# Patient Record
Sex: FEMALE | Race: BLACK OR AFRICAN AMERICAN
[De-identification: names, ages, dates, MRNs, and addresses within clinical notes are randomized per-mention and may not be internally consistent; named-entity substitution may affect disease eponyms.]

---

## 2020-01-19 ENCOUNTER — HOSPITAL ENCOUNTER (EMERGENCY)
Dept: HOSPITAL 53 - M ED | Age: 25
Discharge: HOME | End: 2020-01-19
Payer: COMMERCIAL

## 2020-01-19 VITALS — DIASTOLIC BLOOD PRESSURE: 77 MMHG | SYSTOLIC BLOOD PRESSURE: 133 MMHG

## 2020-01-19 VITALS — HEIGHT: 64 IN | WEIGHT: 158.29 LBS | BODY MASS INDEX: 27.02 KG/M2

## 2020-01-19 DIAGNOSIS — R10.32: ICD-10-CM

## 2020-01-19 DIAGNOSIS — O26.891: ICD-10-CM

## 2020-01-19 DIAGNOSIS — O23.591: Primary | ICD-10-CM

## 2020-01-19 DIAGNOSIS — Z3A.01: ICD-10-CM

## 2020-01-19 LAB
B-HCG SERPL-ACNC: (no result) MIU/ML
BASOPHILS # BLD AUTO: 0 10^3/UL (ref 0–0.2)
BASOPHILS NFR BLD AUTO: 0.2 % (ref 0–1)
BUN SERPL-MCNC: 12 MG/DL (ref 7–18)
CALCIUM SERPL-MCNC: 8.9 MG/DL (ref 8.5–10.1)
CHLAMYDIA DNA AMPLIFICATION: NEGATIVE
CHLORIDE SERPL-SCNC: 106 MEQ/L (ref 98–107)
CO2 SERPL-SCNC: 23 MEQ/L (ref 21–32)
CREAT SERPL-MCNC: 0.69 MG/DL (ref 0.55–1.3)
EOSINOPHIL # BLD AUTO: 0.1 10^3/UL (ref 0–0.5)
EOSINOPHIL NFR BLD AUTO: 1.2 % (ref 0–3)
GFR SERPL CREATININE-BSD FRML MDRD: > 60 ML/MIN/{1.73_M2} (ref 60–?)
GLUCOSE SERPL-MCNC: 95 MG/DL (ref 70–100)
HCT VFR BLD AUTO: 38.3 % (ref 36–47)
HGB BLD-MCNC: 11.9 G/DL (ref 12–15.5)
LYMPHOCYTES # BLD AUTO: 2.6 10^3/UL (ref 1.5–5)
LYMPHOCYTES NFR BLD AUTO: 30.3 % (ref 24–44)
MCH RBC QN AUTO: 30 PG (ref 27–33)
MCHC RBC AUTO-ENTMCNC: 31.1 G/DL (ref 32–36.5)
MCV RBC AUTO: 96.5 FL (ref 80–96)
MONOCYTES # BLD AUTO: 0.5 10^3/UL (ref 0–0.8)
MONOCYTES NFR BLD AUTO: 5.8 % (ref 0–5)
N GONORRHOEA RRNA SPEC QL NAA+PROBE: NEGATIVE
NEUTROPHILS # BLD AUTO: 5.3 10^3/UL (ref 1.5–8.5)
NEUTROPHILS NFR BLD AUTO: 62.1 % (ref 36–66)
PLATELET # BLD AUTO: 207 10^3/UL (ref 150–450)
POTASSIUM SERPL-SCNC: 3.9 MEQ/L (ref 3.5–5.1)
RBC # BLD AUTO: 3.97 10^6/UL (ref 4–5.4)
SODIUM SERPL-SCNC: 139 MEQ/L (ref 136–145)
WBC # BLD AUTO: 8.5 10^3/UL (ref 4–10)

## 2020-01-19 NOTE — REPVR
PROCEDURE INFORMATION: 

Exam: US Pregnancy First Trimester, Transabdominal 

Exam date and time: 1/19/2020 6:33 PM 

Age: 24 years old 

Clinical indication: Pregnancy complicated by abdominal or pelvic pain; Left 

lower quadrant; First trimester; Gestational age or lmp: 5w; Pregnant; 

Additional info: 5.2 weeks pregnant with pelvic pain 



TECHNIQUE: 

Imaging protocol: Real-time transabdominal obstetrical ultrasound of the 

maternal pelvis and a first trimester pregnancy, less than 14 weeks 0 days, 

with image documentation. 



COMPARISON: 

No relevant prior studies available. 



FINDINGS: 



GESTATION: 

Gestation: Single IUP is noted. 

Heart rate: Fetal heart rate 90 bpm. 

Placenta: There is a small subchorionic hemorrhage measuring approximately 9 x 

10 x 18 mm. 

Amniotic fluid: Amniotic and chorionic fluid are normal for gestational age. 

Abdomen: The bladder is unremarkable. 



BIOMETRY: 

Estimated gestational age: Gestational sac measures 1.8, consistent with 6 

weeks 1 day. 

Crown-Rump length: Crown-rump length is 0.5 cm, consistent with 6 weeks 1 day. 

Estimated due date: Estimated date of delivery 09/12/2020. 



MATERNAL: 

Uterus: Unremarkable. 

Cervix: Unremarkable. 

Right adnexa: Unremarkable. 

Left adnexa: Left ovarian corpus luteum. 

Intraperitoneal: No intraperitoneal free fluid. 



IMPRESSION: 

1. Single IUP, gestational age 6 weeks 1 day. 

2. Fetal heart rate 90 bpm. 

3. Small subchorionic hemorrhage. 



Electronically signed by: Belén Powers On 01/19/2020  19:28:11 PM

## 2020-02-25 ENCOUNTER — HOSPITAL ENCOUNTER (EMERGENCY)
Dept: HOSPITAL 53 - M ED | Age: 25
Discharge: HOME | End: 2020-02-25
Payer: COMMERCIAL

## 2020-02-25 VITALS — SYSTOLIC BLOOD PRESSURE: 116 MMHG | DIASTOLIC BLOOD PRESSURE: 69 MMHG

## 2020-02-25 VITALS — HEIGHT: 64 IN | WEIGHT: 156.53 LBS | BODY MASS INDEX: 26.72 KG/M2

## 2020-02-25 DIAGNOSIS — N89.8: ICD-10-CM

## 2020-02-25 DIAGNOSIS — O99.89: Primary | ICD-10-CM

## 2020-02-25 DIAGNOSIS — Z87.440: ICD-10-CM

## 2020-02-25 DIAGNOSIS — Z3A.00: ICD-10-CM

## 2020-02-25 LAB
CHLAMYDIA DNA AMPLIFICATION: NEGATIVE
N GONORRHOEA RRNA SPEC QL NAA+PROBE: NEGATIVE

## 2020-03-12 ENCOUNTER — HOSPITAL ENCOUNTER (EMERGENCY)
Dept: HOSPITAL 53 - M ED | Age: 25
Discharge: HOME | End: 2020-03-12
Payer: COMMERCIAL

## 2020-03-12 VITALS — BODY MASS INDEX: 28.23 KG/M2 | HEIGHT: 64 IN | WEIGHT: 165.35 LBS

## 2020-03-12 VITALS — SYSTOLIC BLOOD PRESSURE: 115 MMHG | DIASTOLIC BLOOD PRESSURE: 61 MMHG

## 2020-03-12 DIAGNOSIS — F32.9: ICD-10-CM

## 2020-03-12 DIAGNOSIS — Z3A.12: ICD-10-CM

## 2020-03-12 DIAGNOSIS — Z63.4: ICD-10-CM

## 2020-03-12 DIAGNOSIS — O99.341: Primary | ICD-10-CM

## 2020-03-12 LAB
ALBUMIN SERPL BCG-MCNC: 3.2 GM/DL (ref 3.2–5.2)
ALT SERPL W P-5'-P-CCNC: 22 U/L (ref 12–78)
AMPHETAMINES UR QL SCN: NEGATIVE
APAP SERPL-MCNC: < 2 UG/ML (ref 10–30)
BARBITURATES UR QL SCN: NEGATIVE
BENZODIAZ UR QL SCN: NEGATIVE
BILIRUB CONJ SERPL-MCNC: < 0.1 MG/DL (ref 0–0.2)
BILIRUB SERPL-MCNC: 0.2 MG/DL (ref 0.2–1)
BUN SERPL-MCNC: 6 MG/DL (ref 7–18)
BZE UR QL SCN: NEGATIVE
CALCIUM SERPL-MCNC: 8.7 MG/DL (ref 8.5–10.1)
CANNABINOIDS UR QL SCN: NEGATIVE
CHLORIDE SERPL-SCNC: 109 MEQ/L (ref 98–107)
CO2 SERPL-SCNC: 23 MEQ/L (ref 21–32)
CREAT SERPL-MCNC: 0.48 MG/DL (ref 0.55–1.3)
ETHANOL SERPL-MCNC: 0 % (ref 0–0.01)
GFR SERPL CREATININE-BSD FRML MDRD: > 60 ML/MIN/{1.73_M2} (ref 60–?)
GLUCOSE SERPL-MCNC: 66 MG/DL (ref 70–100)
HCT VFR BLD AUTO: 37 % (ref 36–47)
HGB BLD-MCNC: 12.1 G/DL (ref 12–15.5)
MCH RBC QN AUTO: 31.3 PG (ref 27–33)
MCHC RBC AUTO-ENTMCNC: 32.7 G/DL (ref 32–36.5)
MCV RBC AUTO: 95.6 FL (ref 80–96)
METHADONE UR QL SCN: NEGATIVE
OPIATES UR QL SCN: NEGATIVE
PCP UR QL SCN: NEGATIVE
PLATELET # BLD AUTO: 187 10^3/UL (ref 150–450)
POTASSIUM SERPL-SCNC: 3.7 MEQ/L (ref 3.5–5.1)
PROT SERPL-MCNC: 6.8 GM/DL (ref 6.4–8.2)
RBC # BLD AUTO: 3.87 10^6/UL (ref 4–5.4)
SALICYLATES SERPL-MCNC: < 1.7 MG/DL (ref 5–30)
SODIUM SERPL-SCNC: 139 MEQ/L (ref 136–145)
TSH SERPL DL<=0.005 MIU/L-ACNC: 1.56 UIU/ML (ref 0.36–3.74)
WBC # BLD AUTO: 9.9 10^3/UL (ref 4–10)

## 2020-03-12 PROCEDURE — 36415 COLL VENOUS BLD VENIPUNCTURE: CPT

## 2020-03-12 PROCEDURE — 85027 COMPLETE CBC AUTOMATED: CPT

## 2020-03-12 PROCEDURE — 99284 EMERGENCY DEPT VISIT MOD MDM: CPT

## 2020-03-12 PROCEDURE — 80048 BASIC METABOLIC PNL TOTAL CA: CPT

## 2020-03-12 PROCEDURE — 84443 ASSAY THYROID STIM HORMONE: CPT

## 2020-03-12 PROCEDURE — 80076 HEPATIC FUNCTION PANEL: CPT

## 2020-03-12 PROCEDURE — 80307 DRUG TEST PRSMV CHEM ANLYZR: CPT

## 2020-03-12 SDOH — SOCIAL STABILITY - SOCIAL INSECURITY: DISSAPEARANCE AND DEATH OF FAMILY MEMBER: Z63.4

## 2020-05-06 ENCOUNTER — HOSPITAL ENCOUNTER (OUTPATIENT)
Dept: HOSPITAL 53 - M LDO | Age: 25
LOS: 1 days | Discharge: HOME | End: 2020-05-07
Attending: MIDWIFE
Payer: COMMERCIAL

## 2020-05-06 VITALS — BODY MASS INDEX: 31.47 KG/M2 | HEIGHT: 64 IN | WEIGHT: 184.31 LBS

## 2020-05-06 VITALS — DIASTOLIC BLOOD PRESSURE: 63 MMHG | SYSTOLIC BLOOD PRESSURE: 128 MMHG

## 2020-05-06 VITALS — DIASTOLIC BLOOD PRESSURE: 72 MMHG | SYSTOLIC BLOOD PRESSURE: 116 MMHG

## 2020-05-06 VITALS — SYSTOLIC BLOOD PRESSURE: 131 MMHG | DIASTOLIC BLOOD PRESSURE: 67 MMHG

## 2020-05-06 DIAGNOSIS — Z3A.20: ICD-10-CM

## 2020-05-06 DIAGNOSIS — M54.5: ICD-10-CM

## 2020-05-06 DIAGNOSIS — R10.30: ICD-10-CM

## 2020-05-06 DIAGNOSIS — O23.40: ICD-10-CM

## 2020-05-06 DIAGNOSIS — O26.892: Primary | ICD-10-CM

## 2020-05-06 PROCEDURE — 96365 THER/PROPH/DIAG IV INF INIT: CPT

## 2020-05-06 NOTE — IPNPDOC
Text Note


Date of Service


The patient was seen on 5/6/20.





NOTE


Patient is  25yo G3  P 2  at  20.5wks but chart not available.  C/o LBP today.  

No dysuria.  No vaginal d/c.  No Hematuria.  No contractions. No loss of fluid 

or bleeding.  + fetal movement.  





PE:


VS WNL


GEN NAD, Comfortable


SVE: Closed and thick without discharge


FHT: 130sno decels. no contractions.


UA: +leuks, +nitr 





A/P: Fetus reassuring.  Patient not in labor and no rupture of membranes.  

Patient with significant UTI.  She was hydrated well with IVF of 500mL NS, 2gm 

Rocephin, and Tylenol 1000mg PO.  Patient given labor precautions, rupture of 

membranes precautions and kick counts.  Patient given pyelo and worsening 

precautions.











Margie Morgan MD               May 6, 2020 21:30

## 2020-05-12 ENCOUNTER — HOSPITAL ENCOUNTER (OUTPATIENT)
Dept: HOSPITAL 53 - M RAD | Age: 25
End: 2020-05-12
Attending: GENERAL PRACTICE
Payer: COMMERCIAL

## 2020-05-12 DIAGNOSIS — Z3A.23: ICD-10-CM

## 2020-05-12 DIAGNOSIS — Z34.02: Primary | ICD-10-CM

## 2020-05-13 NOTE — REP
Clinical:  Anatomical evaluation.

 

Comparison: 01/19/2020 .

 

Findings:

Examination demonstrates a single live intrauterine pregnancy in cephalic

presentation.  Fetal motion is identified by technologist.  Placenta is noted

posterior and grade zero without evidence for placenta previa or abruption.

Amniotic fluid volume is normal.  Cervix measures 3.8 cm in length and appears

closed.  No evidence for nuchal cord.

 

Gestational age by LMP 21 weeks 5 days with ADAN 09/17/2020 .

Gestational age by current measurements 23 weeks 1 day with ADAN 09/07/2020 .

 

FHR equals 160 beats per minute.

BPD  5.6 cm     23 weeks 1 day

HC  20.8 cm     22 weeks 6 days

AC  18.3 cm     23 weeks 1 day

FL  4.0 cm      23 weeks 0 days

HL  3.8 cm      23 weeks 4 days

HC/AC ratio  1.14

 

Estimated fetal weight 559 grams ( 93rd percentile).

 

Anatomical assessment demonstrates normal structures including cranium, choroid

plexus, cavum, cerebellum/posterior fossa, lungs, diaphragm, stomach, cord

insertion/three-vessel cord, kidneys/bladder, spine, and extremities.

 

Impression:

1.  Single live intrauterine pregnancy in cephalic presentation demonstrating

appropriate interval growth when compared to first ultrasound.

2.  Limited evaluation of the facial features and heart/ventricular outflow

tracts may warrant reevaluation and follow-up.  Echogenic focus within the left

cardiac ventricle suggesting prominent chordae tendineae.

 

 

Electronically Signed by

Ankit Coleman MD 05/13/2020 02:26 A